# Patient Record
Sex: FEMALE | Race: NATIVE HAWAIIAN OR OTHER PACIFIC ISLANDER
[De-identification: names, ages, dates, MRNs, and addresses within clinical notes are randomized per-mention and may not be internally consistent; named-entity substitution may affect disease eponyms.]

---

## 2017-05-30 ENCOUNTER — HOSPITAL ENCOUNTER (EMERGENCY)
Dept: HOSPITAL 21 - SED | Age: 67
Discharge: HOME | End: 2017-05-30
Payer: MEDICARE

## 2017-05-30 VITALS
SYSTOLIC BLOOD PRESSURE: 112 MMHG | DIASTOLIC BLOOD PRESSURE: 74 MMHG | RESPIRATION RATE: 12 BRPM | OXYGEN SATURATION: 98 % | HEART RATE: 127 BPM

## 2017-05-30 VITALS
DIASTOLIC BLOOD PRESSURE: 126 MMHG | SYSTOLIC BLOOD PRESSURE: 161 MMHG | RESPIRATION RATE: 15 BRPM | OXYGEN SATURATION: 100 % | HEART RATE: 136 BPM

## 2017-05-30 VITALS
HEART RATE: 130 BPM | DIASTOLIC BLOOD PRESSURE: 85 MMHG | RESPIRATION RATE: 20 BRPM | SYSTOLIC BLOOD PRESSURE: 157 MMHG | OXYGEN SATURATION: 97 %

## 2017-05-30 VITALS — WEIGHT: 165.35 LBS | HEIGHT: 63 IN | BODY MASS INDEX: 29.3 KG/M2

## 2017-05-30 VITALS
RESPIRATION RATE: 15 BRPM | SYSTOLIC BLOOD PRESSURE: 157 MMHG | DIASTOLIC BLOOD PRESSURE: 85 MMHG | HEART RATE: 108 BPM | OXYGEN SATURATION: 100 %

## 2017-05-30 VITALS
HEART RATE: 79 BPM | OXYGEN SATURATION: 88 % | SYSTOLIC BLOOD PRESSURE: 112 MMHG | RESPIRATION RATE: 15 BRPM | DIASTOLIC BLOOD PRESSURE: 74 MMHG

## 2017-05-30 VITALS
HEART RATE: 91 BPM | DIASTOLIC BLOOD PRESSURE: 91 MMHG | SYSTOLIC BLOOD PRESSURE: 134 MMHG | OXYGEN SATURATION: 97 % | RESPIRATION RATE: 18 BRPM

## 2017-05-30 VITALS
HEART RATE: 100 BPM | RESPIRATION RATE: 22 BRPM | SYSTOLIC BLOOD PRESSURE: 157 MMHG | OXYGEN SATURATION: 98 % | DIASTOLIC BLOOD PRESSURE: 85 MMHG

## 2017-05-30 DIAGNOSIS — Z87.891: ICD-10-CM

## 2017-05-30 DIAGNOSIS — I48.3: Primary | ICD-10-CM

## 2017-05-30 DIAGNOSIS — I10: ICD-10-CM

## 2017-05-30 DIAGNOSIS — E78.5: ICD-10-CM

## 2017-05-30 DIAGNOSIS — E11.9: ICD-10-CM

## 2017-05-30 LAB
BASOPHILS % (AUTO): 0.2 % (ref 0–3)
EOSINOPHILS % (AUTO): 2.5 % (ref 0–5)
MAGNESIUM: 2 MG/DL (ref 1.6–2.6)
MCH RBC QN AUTO: 30.7 PG (ref 27–35)
MEAN CORPUSCULAR VOLUME: 92.7 FL (ref 81–100)
MONOCYTES % (AUTO): 7.6 % (ref 4–12)
NEUTROPHILS NFR BLD AUTO: 47 % (ref 40–74)
PLATELET COUNT: 246 BIL/L (ref 150–400)
TROPONIN T SERPL-MCNC: < 0.01 UG/L (ref 0–0.01)

## 2017-05-30 RX ADMIN — METOPROLOL TARTRATE PRN MG: 5 INJECTION, SOLUTION INTRAVENOUS at 15:05

## 2017-05-30 RX ADMIN — METOPROLOL TARTRATE PRN MG: 5 INJECTION, SOLUTION INTRAVENOUS at 15:07

## 2017-05-30 NOTE — DRSVH
PROCEDURE:  X-RAY CHEST ONE VIEW, PORTABLE ([id number])

 

INDICATIONS:  cough, palpitations

 

TECHNIQUE:  One view of the chest was acquired.  

 

COMPARISON:  None.

 

FINDINGS:  

 

Surgical changes and devices:  None.  

 

Lungs and pleura:  No pleural effusions or pneumothorax.  Lungs are mildly edematous.  

 

Mediastinum:  Mediastinal contours appear normal.  Heart size is normal.  

 

Bones and chest wall:  No suspicious bony lesions.  Overlying soft tissues appear unremarkable.  

 

IMPRESSION: Mild edema pattern within the lungs, but the inspiratory volume is reduced and this can p
roduce a false positive since of pulmonary edema.  Obtaining a deep inspiratory PA and lateral chest 
plain film would be helpful for more accurate assessment.

 

 

 

Dictated by:  Jonnathan Holm M.D. on 5/30/2017 at 16:34     

Approved by:  Jonnathan Holm M.D. on 5/30/2017 at 16:35

## 2017-05-30 NOTE — ED.REPORT
HPI-Chest Pain 40 and Over


Date of Service


May 30, 2017


 ED Provider: Andres Bullock MD


The patient is a pleasant 65 yo [place] female with history of Type 2 DM, HTN, 

hyperlipidemia, and gout who was sent to the ED for chest pain and palpitations 

during a cardiac stress test today. Patient reports chest discomfort from 

racing heart rate and shortness of breath while she was on the treadmill. Her 

HR was in 120-150 and BP up to 202/94 today. Patient reports similar symptom in 

2/26/17, which prompted her ER visit. She was found to have narrow QRS 

tachycardia and was treated for PSVT. Her Atenolol was increased from 50mg once 

daily to twice daily at that time. She was also referred to cardiology with Dr. Yousif, who ordered the overnight oximetry, Echo, and stress test. According 

to Dr. Yousif, her tachycardia is likely multifocal atrial tachycardia with no 

atrial flutter activities or Afib.  Her Echo was done on 5/22/17 which was 

normal with EF 65-70% with no valvular pathology. Her stress test was done 

today and no report is available at this time. 





Patient also admits to self-limited, intermittent chest discomfort episodes 

that last a couple seconds. She is not aware of the trigger for the chest 

discomfort. She also has had a persistent, nonproductive cough for 1 week. She 

denies chronic cough, shortness of breath, headache, dizziness, nausea, or 

vomiting. By the time the patient arrives to the ER, her symptoms have pretty 

much resolved. Patient denies However, EKG reveals Atrial flutter with HR in 

the 130s-110s.


Nursing Notes


Stated Complaint:  RAPID HEART RATE


Chief Complaint:  Dysrhythmia/Cardiac


Nursing Notes Reviewed:  Yes


Allergies:  


Coded Allergies:  


     No Known Allergies (Unverified , 5/30/17)





General


Time Seen by MD:  15:05





Chief Complaint


Chest pressure, Shortness of breath


Hx Obtained From:  Patient, Son


Arrived By:  Ambulance


Sudden in Onset?:  Yes


Onset Occurred:  Just prior to arrival


Context of Onset:  Other (during cardiac stress test)


Symptom Duration:  Intermittent


Location:  : Chest left


Radiation:  : Does not radiate


Migration/Movement:  Reports: None


Severity: Current:  No pain currently


Severity: Maximum:  Mild


Associated with:  Reports: Cough, non-productive, Palpitations, Shortness of 

Breath, 


   Denies: Dizziness, Lightheaded, Nausea, Near-syncope, Syncope, Vomiting, 

Weakness, Wheezing


Pertinent Negative:  Pt denies other symptoms


Pertinent Negative:  Exacerbated by nothing, Relieved by nothing


Recent Healthcare:  Recent doctor visit, Recent testing


Similar Sx Previous:  Yes





Past Medical History


Past Medical History Notes:  


Type 2 DM


HTN


Hyperlipidemia


Gout


Past Surgical History





Bilateral oophorectomy, unknown if had hysterectomy as well.





Smoking History


Former Smoker (Used to smoke 1ppd for 8-9 years. Quit since 2/2017)





Social History


Alcohol Use:  Denies alcohol use


Drug Use:  Denies drug use


Other Social History:  Good social support, , Lives with children





Ambulatory Status


Independent





Review of Systems


Basic Review of Systems


Eyes:  Vision NL


ENT:  Hearing NL, No pain, No nasal congestion


:  No dysuria, No frequency


Hematologic:  No bleeding, No bruising


Allergy / Immune:  No allergy


Constitutional:  Reports: Fatigue, 


   Denies: Chills, Fever


Respiratory:  Reports: Non-productive cough, Shortness of breath


Cardiovascular:  Reports: Palpitations, 


   Denies: Edema, Syncope


GI:  Denies: Abdominal pain, Vomiting


Neurologic:  Denies: Abnormal movement, Bowel dysfunction, Change LOC, Confusion

, Dizziness, Headache, Lightheaded, Numbness, Problem walking, Syncope


Psychiatric:  Denies: Confusion, Insomnia, Stress


Complete sys rev & neg:  except as marked.





Physical Exam


Initial Vital Signs





 Vital Signs (First)








  Date Time  Temp Pulse Resp B/P Pulse Ox O2 Delivery O2 Flow Rate FiO2


 


5/30/17 14:48 36.7 136 15 161/126 100 Nasal Cannula 2 








Initial VS:  Reviewed


    Head / Eyes:  Atraumatic, Normocephalic, PERRL


    ENT:  Mucous membranes moist, Conjunctiva normal


    Neck:  Supple, Non-tender


    Lymphatic:  No lymphadenopathy


    Extremities:  Vascular intact, No swelling, No tenderness


    Skin:  Warm, Dry, No cyanosis


    Neurologic:  Alert, Oriented, Nonfocal


    Psychiatric:  Mood/affect normal, Behavior normal, Normal thought content


Respiratory / Chest:  No respiratory distress, No rhonchi, No wheezing, No 

retractions, No stridor, No chest tenderness


Resp Distress / Stridor:  Positive: Speaks phrases


Rales / Rhonchi:  Positive: Rales bilateral bases


Cardiovascular:  Regular rhythm, No gallop, No murmurs


Heart Rate / Rhythm:  Positive: Tachycardia


Abdomen:  Atraumatic, Non-tender, No guarding, No rebound, BS normoactive, No 

distention, No palpable mass


Neurologic:  Oriented X3, Speech NL, No motor deficits, No sensory deficits


Ankle / Foot:  Atraumatic, No swelling, No erythema, Non-tender, No deformity, 

Neurologic intact





Interpretation & Diagnostics


Lab Results Interpretation


Result Diagram:  


5/30/17 1450                                                                   

             5/30/17 1450














Test


  5/30/17


14:50 5/30/17


18:30


 


White Blood Count


  8.3th/mm3


(3.8-10.1) 


 


 


Red Blood Count


  4.11mil/mm3


(3.90-5.20) 


 


 


Hemoglobin


  12.6g/dL


(12.0-15.6) 


 


 


Hematocrit


  38.1%


(35.0-46.0) 


 


 


Mean Corpuscular Volume


  92.7fL


() 


 


 


Mean Corpuscular Hemoglobin


  30.7pg


(27.0-35.0) 


 


 


Mean Corpuscular Hemoglobin


Concent 33.1%


(32.0-37.0) 


 


 


Red Cell Distribution Width


  11.8%


(12.3-15.4) 


 


 


Platelet Count


  246bil/L


(150-400) 


 


 


Neutrophils (%) (Auto) 47.0% (40-74)  


 


Lymphocytes (%) (Auto) 42.5% (14-46)  


 


Monocytes (%) (Auto) 7.6% (4-12)  


 


Eosinophils (%) (Auto) 2.5% (0-5)  


 


Basophils (%) (Auto) 0.2% (0-3)  


 


Sodium Level


  137mEq/L


(134-144) 


 


 


Potassium Level


  4.1mEq/L


(3.5-5.2) 


 


 


Chloride Level


  100mEq/L


() 


 


 


Carbon Dioxide Level


  21mmol/L


(18-29) 


 


 


Blood Urea Nitrogen 14mg/dL (8-27)  


 


Creatinine


  0.93mg/dL


(0.57-1.00) 


 


 


Estimat Glomerular Filtration


Rate 86mL/min (>59) 


  


 


 


Glucose Level


  104mg/dL


(60-99) 


 


 


Calcium Level


  9.5mg/dL


(8.5-10.1) 


 


 


Magnesium Level


  2.0mg/dL


(1.6-2.6) 


 


 


Total Bilirubin


  0.3mg/dL


(0.0-1.2) 


 


 


Aspartate Amino Transf


(AST/SGOT) 31U/L (0-50) 


  


 


 


Alanine Aminotransferase


(ALT/SGPT) 21U/L (0-32) 


  


 


 


Alkaline Phosphatase 87U/L ()  


 


Total Protein


  8.7g/dL


(6.4-8.4) 


 


 


Albumin


  3.8g/dL


(3.4-5.0) 


 


 


Thyroid Stimulating Hormone


(TSH) 2.970uIU/mL


(0.450-4.500) 


 


 


Free Thyroxine


  1.15ng/dL


(0.82-1.77) 


 


 


Hold Tong Top Tube


  Received


(Received) 


 


 


Troponin T


  


  < 0.010ug/L


(0.0-0.011)











Re-Eval/Medical Decision


Med Decision/Clinical Course


The patient is a 65 yo female who presented to the ER with new-onset Aflutter 

and heart rate in the 130s during her cardiac stress test today. She had 

similar symptoms in 2/2017 and was seen by cardiology, Dr. Yousif, as 

outpatient recently. Her recent Echo did not reveal any abnormalities. Her labs

, including TSH, were within normal limit and Troponin was negative x2. She was 

rate controlled with Metoprolol IV 5mg x 3 with no change in her rhythm. 

Cardiology, Dr. Andersen, was consulted in the ER and recommended to start 

Diltiazem and possible cardioversion if she did not convert. However, patient 

spontaneously converted before the Diltiazem. Repeat EKG showed normal sinus 

rhythm with HR of 75. No ST changes was appreciated. No stress test report was 

available at the time of discharge, but since both her EKG and labs were 

reassuring, patient was discharged home with plan to follow up with her PCP and 

cardiology as out patient.


Source of Hx:  Old records, Family





   Time of Eval:  17:00


   Patient Status:  Condition unchanged (Rhythm continues to be Aflutter with 

HR in the 120s-110s)


   Re-Evaluation/Progress Note:  


Paged Dr. Andersen at 1700, who returned the called. Dr. Andersen


recommended to continue rate control with either IV Metoprolol or IV


Diltiazem (10mg) if her BP supports. If she does not convert and patient


has not eaten > 6 hours, can cardiovert her. Dr. Andersen does not think


the patient needs to be admitted to the hospital if cardioversion works.


Dr. Davey will read the stress test tonight.


Diltiazem dose was calculated to be 20mg based on her weight. Lab results


and cardiology's recommendations were discussed with the patient. Will


continue to monitor her heart rhythm/rate and vital signs.





Re-Evaluation at 1810: Patient spontaneously converted to normal sinus


rhythm at rate of 75 before the diltiazem was given. She got a total of


15mg of IV Metoprolol. Will redraw her Troponin and she will likely be


discharged with outpatient cardiology follow-up.


Counseled Regarding:  Diagnosis, Need for follow-up, When/why to return to ED





Discharge & Departure


Shift Change Sign-Out


Discussed Complaint(s):  Yes


Laboratory Evaluation:  Lab evaluation discussed


Imaging Studies:  Imaging discussed


Response to Therapy:  Improved





 Primary Impression:  


 Chest pain


 Chest pain type:  precordial pain  Qualified Code:  R07.2 - Precordial pain


 Additional Impression:  


 Atrial flutter


 Atrial flutter type:  typical  Qualified Code:  I48.3 - Typical atrial flutter


Disposition:  Home





Discharge Condition


All VS Reviewed:  Yes


Condition:  Stable


Patient Instructions:  Palpitations (ED)





Additional Instructions:


Your heart rate was fast and the rhythm was irregular. However, both of these 

resolved with a rate-control medication (Metoprolol).


Please continue to take all your medications as directed. We did not make any 

changes to your medication. 


You will need to follow up with your primary care doctor and Dr. Yousif in a 

week. Dr. Yousif will discuss with you about your stress test result as it is 

not available now. 


Please follow up with your PCP for the cough if it worsens. The chest XR today 

showed some mild swelling (edema) in your lungs. You might need additional 

imaging that will be ordered by your PCP. 


Return to the ER if your symptoms return and/or you develop chest pain, headache

, or change in mental status.


Referrals:  


Twyla Stallings PA-C (PCP)


1 Week








Lukas Yousif MD


1 Week


Attending Statement


I personally saw and examined this patient with  on May 30.  I agree 

with the above exam impression and plan


copies to:   Lukas Yousif MD; Twyla Stallings PA-C, Donald L MD May 30, 2017 15:11


Cristian Tejada DO May 30, 2017 15:57

## 2023-02-06 ENCOUNTER — HOSPITAL ENCOUNTER (OUTPATIENT)
Dept: HOSPITAL 76 - LAB.N | Age: 73
Discharge: HOME | End: 2023-02-06
Attending: PHYSICIAN ASSISTANT
Payer: MEDICARE

## 2023-02-06 DIAGNOSIS — E11.9: Primary | ICD-10-CM

## 2023-02-06 LAB
ALBUMIN DIAFP-MCNC: 4 G/DL (ref 3.2–5.5)
ALBUMIN/GLOB SERPL: 1 {RATIO} (ref 1–2.2)
ALP SERPL-CCNC: 61 IU/L (ref 42–121)
ALT SERPL W P-5'-P-CCNC: 26 IU/L (ref 10–60)
ANION GAP SERPL CALCULATED.4IONS-SCNC: 9 MMOL/L (ref 6–13)
AST SERPL W P-5'-P-CCNC: 27 IU/L (ref 10–42)
BILIRUB BLD-MCNC: 0.6 MG/DL (ref 0.2–1)
BUN SERPL-MCNC: 22 MG/DL (ref 6–20)
CALCIUM UR-MCNC: 9.9 MG/DL (ref 8.5–10.3)
CHLORIDE SERPL-SCNC: 104 MMOL/L (ref 101–111)
CHOLEST SERPL-MCNC: 127 MG/DL
CO2 SERPL-SCNC: 27 MMOL/L (ref 21–32)
CREAT SERPLBLD-SCNC: 1.4 MG/DL (ref 0.4–1)
EST. AVERAGE GLUCOSE BLD GHB EST-MCNC: 180 MG/DL (ref 70–100)
GFRSERPLBLD MDRD-ARVRAT: 37 ML/MIN/{1.73_M2} (ref 89–?)
GLOBULIN SER-MCNC: 4 G/DL (ref 2.1–4.2)
GLUCOSE SERPL-MCNC: 175 MG/DL (ref 70–100)
HBA1C MFR BLD HPLC: 7.9 % (ref 4.27–6.07)
HDLC SERPL-MCNC: 38 MG/DL
HDLC SERPL: 3.3 {RATIO} (ref ?–4.4)
LDLC SERPL CALC-MCNC: 65 MG/DL
LDLC/HDLC SERPL: 1.7 {RATIO} (ref ?–4.4)
POTASSIUM SERPL-SCNC: 4.4 MMOL/L (ref 3.5–5)
PROT SPEC-MCNC: 8 G/DL (ref 6.7–8.2)
SODIUM SERPLBLD-SCNC: 140 MMOL/L (ref 135–145)
TRIGL P FAST SERPL-MCNC: 120 MG/DL
VLDLC SERPL-SCNC: 24 MG/DL

## 2023-02-06 PROCEDURE — 80061 LIPID PANEL: CPT

## 2023-02-06 PROCEDURE — 80053 COMPREHEN METABOLIC PANEL: CPT

## 2023-02-06 PROCEDURE — 36415 COLL VENOUS BLD VENIPUNCTURE: CPT

## 2023-02-06 PROCEDURE — 83721 ASSAY OF BLOOD LIPOPROTEIN: CPT

## 2023-02-06 PROCEDURE — 83036 HEMOGLOBIN GLYCOSYLATED A1C: CPT

## 2024-01-12 ENCOUNTER — HOSPITAL ENCOUNTER (OUTPATIENT)
Dept: HOSPITAL 76 - DI | Age: 74
Discharge: HOME | End: 2024-01-12
Attending: PHYSICIAN ASSISTANT
Payer: MEDICARE

## 2024-01-12 DIAGNOSIS — M85.88: ICD-10-CM

## 2024-01-12 DIAGNOSIS — Z78.0: Primary | ICD-10-CM
